# Patient Record
Sex: FEMALE | Race: OTHER | URBAN - METROPOLITAN AREA
[De-identification: names, ages, dates, MRNs, and addresses within clinical notes are randomized per-mention and may not be internally consistent; named-entity substitution may affect disease eponyms.]

---

## 2021-02-24 ENCOUNTER — LAB OUTSIDE AN ENCOUNTER (OUTPATIENT)
Dept: URBAN - METROPOLITAN AREA CLINIC 126 | Facility: CLINIC | Age: 26
End: 2021-02-24

## 2021-02-24 ENCOUNTER — WEB ENCOUNTER (OUTPATIENT)
Dept: URBAN - METROPOLITAN AREA CLINIC 126 | Facility: CLINIC | Age: 26
End: 2021-02-24

## 2021-02-24 ENCOUNTER — OFFICE VISIT (OUTPATIENT)
Dept: URBAN - METROPOLITAN AREA CLINIC 126 | Facility: CLINIC | Age: 26
End: 2021-02-24
Payer: COMMERCIAL

## 2021-02-24 DIAGNOSIS — R10.12 PAIN, ABDOMINAL, LUQ: ICD-10-CM

## 2021-02-24 DIAGNOSIS — R63.4 WEIGHT LOSS: ICD-10-CM

## 2021-02-24 DIAGNOSIS — K58.1 IRRITABLE BOWEL SYNDROME WITH CONSTIPATION: ICD-10-CM

## 2021-02-24 PROCEDURE — 99204 OFFICE O/P NEW MOD 45 MIN: CPT | Performed by: NURSE PRACTITIONER

## 2021-02-24 NOTE — HPI-TODAY'S VISIT:
Very pleasant 25 yr old  female w/ PMHx seizures, developmental delay and hyperlipidemia. Visit was completed via  over the phone. She is here with her mother for c/o LUQ abdominal pain. Pain has been ongoing for months. She has hx of constipation.  She has tried miralax but only had small hard BM daily. She has lost about 5 pounds in past 1 month. Patient's mother is tearful when discussing patient's symptoms.  Pt denies nausea/vomiting. no RUQ pain. no GERD symptoms. No family hx of GI cancers.

## 2021-02-24 NOTE — PHYSICAL EXAM GASTROINTESTINAL
left upper quadrant tenderness, Abdomen , soft, nondistended , no guarding or rigidity , no masses palpable , normal bowel sounds , Liver and Spleen , no hepatomegaly present , no hepatosplenomegaly , liver nontender , spleen not palpable

## 2021-03-17 ENCOUNTER — OFFICE VISIT (OUTPATIENT)
Dept: URBAN - METROPOLITAN AREA CLINIC 126 | Facility: CLINIC | Age: 26
End: 2021-03-17

## 2021-04-07 ENCOUNTER — OFFICE VISIT (OUTPATIENT)
Dept: URBAN - METROPOLITAN AREA CLINIC 126 | Facility: CLINIC | Age: 26
End: 2021-04-07
Payer: COMMERCIAL

## 2021-04-07 DIAGNOSIS — K58.1 IRRITABLE BOWEL SYNDROME WITH CONSTIPATION: ICD-10-CM

## 2021-04-07 PROBLEM — 440630006: Status: ACTIVE | Noted: 2021-02-24

## 2021-04-07 PROCEDURE — 99213 OFFICE O/P EST LOW 20 MIN: CPT | Performed by: NURSE PRACTITIONER

## 2021-04-07 RX ORDER — LINACLOTIDE 72 UG/1
1 CAPSULE AT LEAST 30 MINUTES BEFORE THE FIRST MEAL OF THE DAY ON AN EMPTY STOMACH CAPSULE, GELATIN COATED ORAL ONCE A DAY
Qty: 90 CAPSULE | Refills: 1 | OUTPATIENT
Start: 2021-04-07 | End: 2021-10-04

## 2021-04-07 NOTE — HPI-TODAY'S VISIT:
Pleasant 25 yr old female with developmental delay seen for abdominal pain. At her last visit we ordered CT and started pt on linzess. CT normal except with evidence of constipation. She took linzess 72mcg and had effective bowel movements. Her pain resolved. She does have irregular menstrual cycles and has cramping with cycles. Mother is with pt and provides all history. Visit was completed with assistance of .

## 2021-07-06 ENCOUNTER — DASHBOARD ENCOUNTERS (OUTPATIENT)
Age: 26
End: 2021-07-06

## 2021-07-07 ENCOUNTER — OFFICE VISIT (OUTPATIENT)
Dept: URBAN - METROPOLITAN AREA CLINIC 126 | Facility: CLINIC | Age: 26
End: 2021-07-07

## 2021-07-07 RX ORDER — LINACLOTIDE 72 UG/1
1 CAPSULE AT LEAST 30 MINUTES BEFORE THE FIRST MEAL OF THE DAY ON AN EMPTY STOMACH CAPSULE, GELATIN COATED ORAL ONCE A DAY
Qty: 90 CAPSULE | Refills: 1 | Status: ACTIVE | COMMUNITY
Start: 2021-04-07 | End: 2021-10-04

## 2024-11-12 NOTE — PHYSICAL EXAM CHEST:
I was unable to reschedule pt appt for February 2025. Pt was sched for 2/17/25 but will be away at a conference. I called office directly and Odette relayed that she put a ticket in for Dr LARRY gee and she has not heard back. I advised pt to call back by the end of the week to sched.  Note for call documentation purposes only.   no lesions,  no deformities,  no traumatic injuries,  no significant scars are present,  chest wall non-tender,  no masses present, breathing is unlabored without accessory muscle use, normal breath sounds